# Patient Record
Sex: FEMALE | Race: WHITE
[De-identification: names, ages, dates, MRNs, and addresses within clinical notes are randomized per-mention and may not be internally consistent; named-entity substitution may affect disease eponyms.]

---

## 2018-06-26 ENCOUNTER — HOSPITAL ENCOUNTER (OUTPATIENT)
Dept: HOSPITAL 68 - STS | Age: 83
End: 2018-06-26
Payer: COMMERCIAL

## 2018-06-26 VITALS — WEIGHT: 126 LBS | BODY MASS INDEX: 24.74 KG/M2 | HEIGHT: 60 IN

## 2018-06-26 DIAGNOSIS — H25.89: Primary | ICD-10-CM

## 2018-06-26 DIAGNOSIS — I10: ICD-10-CM

## 2018-06-26 PROCEDURE — V2632 POST CHMBR INTRAOCULAR LENS: HCPCS

## 2018-06-26 NOTE — OPERATIVE REPORT
Operative/Inv Procedure Report
Surgery Date: 06/26/18
Name of Procedure:
Complex cataract extraction lens implantation right eye
Pre-Operative Diagnosis:
Age related complex cataract right eye 20/25 vision 20/80 glare vision
Post-Operative Diagnosis:
Same
Estimated Blood Loss: none
Surgeon/Assistant:
Corby ALLISON,Louie BLANCO
 
Anesthesia: local monitored anesthesi
Complications:
None
 
Operative/Procedure Note
Note:
The patient was brought to the operating room standard monitoring equipment was 
attached the patient was prepped and draped in the usual fashion for intraocular
surgery.  A lid speculum was placed to retract the lids.  The case was begun by 
making 1 partial-thickness corneal relaxing [incision] at 5.  A temporal 
incision with a 2.4 mm keratome.  The eye was stabilized with a Fleming ring 
during this incision.  1 mL of non-preserved lidocaine was introduced into the 
anterior chamber to provide anesthesia.  The anterior chamber was then filled 
and deepened with viscoelastic.  A Malyugin ring was then introduced into the 
anterior chamber and placed with its forklifts underneath her holding the iris. 
This was done in order to enlarge the pupil so that the capsulorrhexis could be 
achieved safely.  A curvilinear capsulorrhexis was made using a 30-gauge needle 
and is a cystotome and capsulorrhexis was finished using a Utrata forceps.  A 
second or paracentesis incision was made temporally with a 1 mm MVR blade.  The 
lens was then hydrodissected with balanced salt solution and found to be 
rotatable.  The lens was emulsified using phacoemulsification and a modified 
four-quadrant cracking technique.  The residual cortical material was removed 
using automated irrigation and aspiration and as much of the anterior capsular 
rim was cleaned as well as possible.  The posterior capsule was cleaned first 
with the automated machine on a low setting and then manually with a Shawn 
squeegee.  The capsular bag was deepened with viscoelastic.  The lens a Akreos 
AO60 22.5  Diopter placed into the bag under direct visualization and rotated so
that the haptics were at 12 and 6:00.  The Myalugin ring was then disengaged 
from the iris and removed from the eye using the .  Viscoelastic was 
then removed from the eye by flushing it out and then by automated irrigation 
and aspiration.  The eye was pressurized to a normal tone.  1/10 of a cc of 
vancomycin solution was introduced into the anterior chamber to provide 
antibiotic prophylaxis.  The wounds were sealed by hydrating the stroma adjacent
to them and the eye was left at a proper tone after the wounds were checked and 
found not to be leaking.  The lid speculum was removed from the orbit.  
Antibiotic and steroid drops were placed on the eye and then the eye was 
shielded.  Monitoring equipment was removed from the patient and the patient was
removed from the operative suite to the holding area.  The patient tolerated the
procedure well and will be seen in the office tomorrow.